# Patient Record
Sex: MALE | Race: ASIAN | NOT HISPANIC OR LATINO
[De-identification: names, ages, dates, MRNs, and addresses within clinical notes are randomized per-mention and may not be internally consistent; named-entity substitution may affect disease eponyms.]

---

## 2017-10-26 ENCOUNTER — APPOINTMENT (OUTPATIENT)
Dept: GASTROENTEROLOGY | Facility: CLINIC | Age: 22
End: 2017-10-26
Payer: COMMERCIAL

## 2017-10-26 VITALS
TEMPERATURE: 98.4 F | WEIGHT: 195 LBS | SYSTOLIC BLOOD PRESSURE: 110 MMHG | HEIGHT: 74 IN | DIASTOLIC BLOOD PRESSURE: 70 MMHG | BODY MASS INDEX: 25.03 KG/M2 | HEART RATE: 88 BPM | OXYGEN SATURATION: 98 %

## 2017-10-26 DIAGNOSIS — R74.8 ABNORMAL LEVELS OF OTHER SERUM ENZYMES: ICD-10-CM

## 2017-10-26 DIAGNOSIS — R79.89 OTHER SPECIFIED ABNORMAL FINDINGS OF BLOOD CHEMISTRY: ICD-10-CM

## 2017-10-26 PROCEDURE — 99244 OFF/OP CNSLTJ NEW/EST MOD 40: CPT

## 2018-05-18 ENCOUNTER — EMERGENCY (EMERGENCY)
Facility: HOSPITAL | Age: 23
LOS: 1 days | Discharge: HOME | End: 2018-05-18
Attending: EMERGENCY MEDICINE | Admitting: EMERGENCY MEDICINE
Payer: COMMERCIAL

## 2018-05-18 VITALS
RESPIRATION RATE: 17 BRPM | SYSTOLIC BLOOD PRESSURE: 135 MMHG | HEIGHT: 73 IN | TEMPERATURE: 99 F | HEART RATE: 72 BPM | OXYGEN SATURATION: 99 % | WEIGHT: 214.07 LBS | DIASTOLIC BLOOD PRESSURE: 72 MMHG

## 2018-05-18 PROCEDURE — 99283 EMERGENCY DEPT VISIT LOW MDM: CPT

## 2018-05-18 PROCEDURE — 99284 EMERGENCY DEPT VISIT MOD MDM: CPT

## 2018-05-18 RX ADMIN — Medication 60 MILLIGRAM(S): at 04:42

## 2018-05-18 NOTE — ED PROVIDER NOTE - CHPI ED SYMPTOMS NEG
no cough/no wheezing/no swelling of face, tongue/no vomiting/no shortness of breath/no nausea/no throat itching/no difficulty breathing/no difficulty swallowing

## 2018-05-18 NOTE — ED PROVIDER NOTE - OBJECTIVE STATEMENT
Patient developed a rash on arms, legs and lower trunk about 3 hours ago. Itchy. Patient took benadryl and states rash is much better, now only on arms. No difficulty speaking, swallowing or breathing. No known new exposures, but has multiple allergies

## 2018-05-18 NOTE — ED ADULT NURSE NOTE - OBJECTIVE STATEMENT
Pt reports itching and rash to his arms, waist area, and behind his knees which he noticed several hours ago. Pt took 2 benadryl tabs and the itching has subsided, the rash resolved around waist and behind his knees, resolving on his arms. Pt is breathing without any difficulty, denies any involvement of his face or throat. Pt has known environmental allergies but does not typically have this type of reaction. Mother is with pt.

## 2018-05-18 NOTE — ED ADULT NURSE NOTE - CAS EDN DISCHARGE ASSESSMENT
Alert and oriented to person, place and time/No adverse reaction to first time med in ED/Symptoms improved/Awake

## 2021-01-20 NOTE — ED ADULT NURSE NOTE - QUALITY
Hospitalist Hospitalist Hospitalist Infectious Disease Intervent Radiology Nephrology Endocrinology Hospitalist Hospitalist Hospitalist Hospitalist Infectious Disease Nephrology Nephrology Cardiology Cardiology Cardiology Endocrinology Endocrinology Endocrinology Endocrinology Endocrinology Hospitalist Hospitalist Hospitalist Hospitalist Hospitalist Hospitalist Hospitalist Infectious Disease Infectious Disease Infectious Disease Infectious Disease Infectious Disease Infectious Disease Infectious Disease Infectious Disease Nephrology Nephrology Nephrology Nephrology Nephrology Endocrinology Endocrinology Endocrinology Endocrinology Hospitalist Hospitalist Hospitalist Hospitalist Hospitalist Infectious Disease Infectious Disease Infectious Disease Nephrology Nephrology Hospitalist Hospitalist itchy

## 2021-11-02 ENCOUNTER — APPOINTMENT (OUTPATIENT)
Dept: DERMATOLOGY | Facility: CLINIC | Age: 26
End: 2021-11-02
Payer: COMMERCIAL

## 2021-11-02 VITALS — WEIGHT: 195 LBS | BODY MASS INDEX: 25.03 KG/M2 | HEIGHT: 74 IN

## 2021-11-02 DIAGNOSIS — L81.0 POSTINFLAMMATORY HYPERPIGMENTATION: ICD-10-CM

## 2021-11-02 DIAGNOSIS — L73.9 FOLLICULAR DISORDER, UNSPECIFIED: ICD-10-CM

## 2021-11-02 DIAGNOSIS — L65.0 TELOGEN EFFLUVIUM: ICD-10-CM

## 2021-11-02 PROCEDURE — 99204 OFFICE O/P NEW MOD 45 MIN: CPT

## 2023-05-12 NOTE — ED ADULT NURSE NOTE - EENT WDL
Addressing in other encounter from 5/12/23.    Eyes with no visual disturbances.  Ears clean and dry and no hearing difficulties. Nose with pink mucosa and no drainage.  Mouth mucous membranes moist and pink.  No tenderness or swelling to throat or neck.

## 2023-11-09 ENCOUNTER — EMERGENCY (EMERGENCY)
Facility: HOSPITAL | Age: 28
LOS: 1 days | Discharge: HOME | End: 2023-11-09
Attending: EMERGENCY MEDICINE
Payer: COMMERCIAL

## 2023-11-09 VITALS
SYSTOLIC BLOOD PRESSURE: 150 MMHG | RESPIRATION RATE: 20 BRPM | OXYGEN SATURATION: 99 % | WEIGHT: 220.02 LBS | HEART RATE: 73 BPM | DIASTOLIC BLOOD PRESSURE: 90 MMHG | TEMPERATURE: 98 F | HEIGHT: 75 IN

## 2023-11-09 VITALS
OXYGEN SATURATION: 99 % | SYSTOLIC BLOOD PRESSURE: 123 MMHG | HEART RATE: 78 BPM | RESPIRATION RATE: 20 BRPM | TEMPERATURE: 98 F | DIASTOLIC BLOOD PRESSURE: 82 MMHG

## 2023-11-09 LAB
ALBUMIN SERPL ELPH-MCNC: 4.9 G/DL — SIGNIFICANT CHANGE UP (ref 3.3–5)
ALBUMIN SERPL ELPH-MCNC: 4.9 G/DL — SIGNIFICANT CHANGE UP (ref 3.3–5)
ALP SERPL-CCNC: 90 U/L — SIGNIFICANT CHANGE UP (ref 40–120)
ALP SERPL-CCNC: 90 U/L — SIGNIFICANT CHANGE UP (ref 40–120)
ALT FLD-CCNC: 45 U/L — SIGNIFICANT CHANGE UP (ref 10–45)
ALT FLD-CCNC: 45 U/L — SIGNIFICANT CHANGE UP (ref 10–45)
ANION GAP SERPL CALC-SCNC: 17 MMOL/L — SIGNIFICANT CHANGE UP (ref 5–17)
ANION GAP SERPL CALC-SCNC: 17 MMOL/L — SIGNIFICANT CHANGE UP (ref 5–17)
APPEARANCE UR: ABNORMAL
APPEARANCE UR: ABNORMAL
AST SERPL-CCNC: 19 U/L — SIGNIFICANT CHANGE UP (ref 10–40)
AST SERPL-CCNC: 19 U/L — SIGNIFICANT CHANGE UP (ref 10–40)
BACTERIA # UR AUTO: NEGATIVE /HPF — SIGNIFICANT CHANGE UP
BACTERIA # UR AUTO: NEGATIVE /HPF — SIGNIFICANT CHANGE UP
BASOPHILS # BLD AUTO: 0.08 K/UL — SIGNIFICANT CHANGE UP (ref 0–0.2)
BASOPHILS # BLD AUTO: 0.08 K/UL — SIGNIFICANT CHANGE UP (ref 0–0.2)
BASOPHILS NFR BLD AUTO: 0.9 % — SIGNIFICANT CHANGE UP (ref 0–2)
BASOPHILS NFR BLD AUTO: 0.9 % — SIGNIFICANT CHANGE UP (ref 0–2)
BILIRUB SERPL-MCNC: 0.3 MG/DL — SIGNIFICANT CHANGE UP (ref 0.2–1.2)
BILIRUB SERPL-MCNC: 0.3 MG/DL — SIGNIFICANT CHANGE UP (ref 0.2–1.2)
BILIRUB UR-MCNC: NEGATIVE — SIGNIFICANT CHANGE UP
BILIRUB UR-MCNC: NEGATIVE — SIGNIFICANT CHANGE UP
BUN SERPL-MCNC: 24 MG/DL — HIGH (ref 7–23)
BUN SERPL-MCNC: 24 MG/DL — HIGH (ref 7–23)
CALCIUM SERPL-MCNC: 9.8 MG/DL — SIGNIFICANT CHANGE UP (ref 8.4–10.5)
CALCIUM SERPL-MCNC: 9.8 MG/DL — SIGNIFICANT CHANGE UP (ref 8.4–10.5)
CAST: 2 /LPF — SIGNIFICANT CHANGE UP (ref 0–4)
CAST: 2 /LPF — SIGNIFICANT CHANGE UP (ref 0–4)
CHLORIDE SERPL-SCNC: 100 MMOL/L — SIGNIFICANT CHANGE UP (ref 96–108)
CHLORIDE SERPL-SCNC: 100 MMOL/L — SIGNIFICANT CHANGE UP (ref 96–108)
CO2 SERPL-SCNC: 23 MMOL/L — SIGNIFICANT CHANGE UP (ref 22–31)
CO2 SERPL-SCNC: 23 MMOL/L — SIGNIFICANT CHANGE UP (ref 22–31)
COLOR SPEC: YELLOW — SIGNIFICANT CHANGE UP
COLOR SPEC: YELLOW — SIGNIFICANT CHANGE UP
CREAT SERPL-MCNC: 1.28 MG/DL — SIGNIFICANT CHANGE UP (ref 0.5–1.3)
CREAT SERPL-MCNC: 1.28 MG/DL — SIGNIFICANT CHANGE UP (ref 0.5–1.3)
DIFF PNL FLD: ABNORMAL
DIFF PNL FLD: ABNORMAL
EGFR: 80 ML/MIN/1.73M2 — SIGNIFICANT CHANGE UP
EGFR: 80 ML/MIN/1.73M2 — SIGNIFICANT CHANGE UP
EOSINOPHIL # BLD AUTO: 0.55 K/UL — HIGH (ref 0–0.5)
EOSINOPHIL # BLD AUTO: 0.55 K/UL — HIGH (ref 0–0.5)
EOSINOPHIL NFR BLD AUTO: 5.9 % — SIGNIFICANT CHANGE UP (ref 0–6)
EOSINOPHIL NFR BLD AUTO: 5.9 % — SIGNIFICANT CHANGE UP (ref 0–6)
GLUCOSE SERPL-MCNC: 96 MG/DL — SIGNIFICANT CHANGE UP (ref 70–99)
GLUCOSE SERPL-MCNC: 96 MG/DL — SIGNIFICANT CHANGE UP (ref 70–99)
GLUCOSE UR QL: NEGATIVE MG/DL — SIGNIFICANT CHANGE UP
GLUCOSE UR QL: NEGATIVE MG/DL — SIGNIFICANT CHANGE UP
HCT VFR BLD CALC: 48.9 % — SIGNIFICANT CHANGE UP (ref 39–50)
HCT VFR BLD CALC: 48.9 % — SIGNIFICANT CHANGE UP (ref 39–50)
HGB BLD-MCNC: 15.6 G/DL — SIGNIFICANT CHANGE UP (ref 13–17)
HGB BLD-MCNC: 15.6 G/DL — SIGNIFICANT CHANGE UP (ref 13–17)
IMM GRANULOCYTES NFR BLD AUTO: 0.3 % — SIGNIFICANT CHANGE UP (ref 0–0.9)
IMM GRANULOCYTES NFR BLD AUTO: 0.3 % — SIGNIFICANT CHANGE UP (ref 0–0.9)
KETONES UR-MCNC: NEGATIVE MG/DL — SIGNIFICANT CHANGE UP
KETONES UR-MCNC: NEGATIVE MG/DL — SIGNIFICANT CHANGE UP
LEUKOCYTE ESTERASE UR-ACNC: ABNORMAL
LEUKOCYTE ESTERASE UR-ACNC: ABNORMAL
LYMPHOCYTES # BLD AUTO: 2.67 K/UL — SIGNIFICANT CHANGE UP (ref 1–3.3)
LYMPHOCYTES # BLD AUTO: 2.67 K/UL — SIGNIFICANT CHANGE UP (ref 1–3.3)
LYMPHOCYTES # BLD AUTO: 28.5 % — SIGNIFICANT CHANGE UP (ref 13–44)
LYMPHOCYTES # BLD AUTO: 28.5 % — SIGNIFICANT CHANGE UP (ref 13–44)
MCHC RBC-ENTMCNC: 27.8 PG — SIGNIFICANT CHANGE UP (ref 27–34)
MCHC RBC-ENTMCNC: 27.8 PG — SIGNIFICANT CHANGE UP (ref 27–34)
MCHC RBC-ENTMCNC: 31.9 GM/DL — LOW (ref 32–36)
MCHC RBC-ENTMCNC: 31.9 GM/DL — LOW (ref 32–36)
MCV RBC AUTO: 87.2 FL — SIGNIFICANT CHANGE UP (ref 80–100)
MCV RBC AUTO: 87.2 FL — SIGNIFICANT CHANGE UP (ref 80–100)
MONOCYTES # BLD AUTO: 0.7 K/UL — SIGNIFICANT CHANGE UP (ref 0–0.9)
MONOCYTES # BLD AUTO: 0.7 K/UL — SIGNIFICANT CHANGE UP (ref 0–0.9)
MONOCYTES NFR BLD AUTO: 7.5 % — SIGNIFICANT CHANGE UP (ref 2–14)
MONOCYTES NFR BLD AUTO: 7.5 % — SIGNIFICANT CHANGE UP (ref 2–14)
NEUTROPHILS # BLD AUTO: 5.34 K/UL — SIGNIFICANT CHANGE UP (ref 1.8–7.4)
NEUTROPHILS # BLD AUTO: 5.34 K/UL — SIGNIFICANT CHANGE UP (ref 1.8–7.4)
NEUTROPHILS NFR BLD AUTO: 56.9 % — SIGNIFICANT CHANGE UP (ref 43–77)
NEUTROPHILS NFR BLD AUTO: 56.9 % — SIGNIFICANT CHANGE UP (ref 43–77)
NITRITE UR-MCNC: NEGATIVE — SIGNIFICANT CHANGE UP
NITRITE UR-MCNC: NEGATIVE — SIGNIFICANT CHANGE UP
NRBC # BLD: 0 /100 WBCS — SIGNIFICANT CHANGE UP (ref 0–0)
NRBC # BLD: 0 /100 WBCS — SIGNIFICANT CHANGE UP (ref 0–0)
PH UR: 5.5 — SIGNIFICANT CHANGE UP (ref 5–8)
PH UR: 5.5 — SIGNIFICANT CHANGE UP (ref 5–8)
PLATELET # BLD AUTO: 371 K/UL — SIGNIFICANT CHANGE UP (ref 150–400)
PLATELET # BLD AUTO: 371 K/UL — SIGNIFICANT CHANGE UP (ref 150–400)
POTASSIUM SERPL-MCNC: 4.2 MMOL/L — SIGNIFICANT CHANGE UP (ref 3.5–5.3)
POTASSIUM SERPL-MCNC: 4.2 MMOL/L — SIGNIFICANT CHANGE UP (ref 3.5–5.3)
POTASSIUM SERPL-SCNC: 4.2 MMOL/L — SIGNIFICANT CHANGE UP (ref 3.5–5.3)
POTASSIUM SERPL-SCNC: 4.2 MMOL/L — SIGNIFICANT CHANGE UP (ref 3.5–5.3)
PROT SERPL-MCNC: 8.1 G/DL — SIGNIFICANT CHANGE UP (ref 6–8.3)
PROT SERPL-MCNC: 8.1 G/DL — SIGNIFICANT CHANGE UP (ref 6–8.3)
PROT UR-MCNC: SIGNIFICANT CHANGE UP MG/DL
PROT UR-MCNC: SIGNIFICANT CHANGE UP MG/DL
RBC # BLD: 5.61 M/UL — SIGNIFICANT CHANGE UP (ref 4.2–5.8)
RBC # BLD: 5.61 M/UL — SIGNIFICANT CHANGE UP (ref 4.2–5.8)
RBC # FLD: 11.8 % — SIGNIFICANT CHANGE UP (ref 10.3–14.5)
RBC # FLD: 11.8 % — SIGNIFICANT CHANGE UP (ref 10.3–14.5)
RBC CASTS # UR COMP ASSIST: 11 /HPF — HIGH (ref 0–4)
RBC CASTS # UR COMP ASSIST: 11 /HPF — HIGH (ref 0–4)
REVIEW: SIGNIFICANT CHANGE UP
REVIEW: SIGNIFICANT CHANGE UP
SODIUM SERPL-SCNC: 140 MMOL/L — SIGNIFICANT CHANGE UP (ref 135–145)
SODIUM SERPL-SCNC: 140 MMOL/L — SIGNIFICANT CHANGE UP (ref 135–145)
SP GR SPEC: 1.02 — SIGNIFICANT CHANGE UP (ref 1–1.03)
SP GR SPEC: 1.02 — SIGNIFICANT CHANGE UP (ref 1–1.03)
SQUAMOUS # UR AUTO: 2 /HPF — SIGNIFICANT CHANGE UP (ref 0–5)
SQUAMOUS # UR AUTO: 2 /HPF — SIGNIFICANT CHANGE UP (ref 0–5)
URATE CRY FLD QL MICRO: PRESENT
URATE CRY FLD QL MICRO: PRESENT
UROBILINOGEN FLD QL: 0.2 MG/DL — SIGNIFICANT CHANGE UP (ref 0.2–1)
UROBILINOGEN FLD QL: 0.2 MG/DL — SIGNIFICANT CHANGE UP (ref 0.2–1)
WBC # BLD: 9.37 K/UL — SIGNIFICANT CHANGE UP (ref 3.8–10.5)
WBC # BLD: 9.37 K/UL — SIGNIFICANT CHANGE UP (ref 3.8–10.5)
WBC # FLD AUTO: 9.37 K/UL — SIGNIFICANT CHANGE UP (ref 3.8–10.5)
WBC # FLD AUTO: 9.37 K/UL — SIGNIFICANT CHANGE UP (ref 3.8–10.5)
WBC UR QL: 6 /HPF — HIGH (ref 0–5)
WBC UR QL: 6 /HPF — HIGH (ref 0–5)

## 2023-11-09 PROCEDURE — 74018 RADEX ABDOMEN 1 VIEW: CPT | Mod: 26

## 2023-11-09 PROCEDURE — 80053 COMPREHEN METABOLIC PANEL: CPT

## 2023-11-09 PROCEDURE — 81001 URINALYSIS AUTO W/SCOPE: CPT

## 2023-11-09 PROCEDURE — 76770 US EXAM ABDO BACK WALL COMP: CPT | Mod: 26

## 2023-11-09 PROCEDURE — 99284 EMERGENCY DEPT VISIT MOD MDM: CPT

## 2023-11-09 PROCEDURE — 85025 COMPLETE CBC W/AUTO DIFF WBC: CPT

## 2023-11-09 PROCEDURE — 99284 EMERGENCY DEPT VISIT MOD MDM: CPT | Mod: 25

## 2023-11-09 PROCEDURE — 76770 US EXAM ABDO BACK WALL COMP: CPT

## 2023-11-09 PROCEDURE — 74018 RADEX ABDOMEN 1 VIEW: CPT

## 2023-11-09 PROCEDURE — 87086 URINE CULTURE/COLONY COUNT: CPT

## 2023-11-09 PROCEDURE — 96374 THER/PROPH/DIAG INJ IV PUSH: CPT

## 2023-11-09 RX ORDER — ONDANSETRON 8 MG/1
1 TABLET, FILM COATED ORAL
Qty: 6 | Refills: 0
Start: 2023-11-09 | End: 2023-11-10

## 2023-11-09 RX ORDER — OXYCODONE HYDROCHLORIDE 5 MG/1
1 TABLET ORAL
Qty: 6 | Refills: 0
Start: 2023-11-09 | End: 2023-11-10

## 2023-11-09 RX ORDER — SODIUM CHLORIDE 9 MG/ML
1000 INJECTION, SOLUTION INTRAVENOUS ONCE
Refills: 0 | Status: COMPLETED | OUTPATIENT
Start: 2023-11-09 | End: 2023-11-09

## 2023-11-09 RX ORDER — ONDANSETRON 8 MG/1
1 TABLET, FILM COATED ORAL
Qty: 1 | Refills: 0
Start: 2023-11-09 | End: 2023-11-11

## 2023-11-09 RX ORDER — KETOROLAC TROMETHAMINE 30 MG/ML
15 SYRINGE (ML) INJECTION ONCE
Refills: 0 | Status: DISCONTINUED | OUTPATIENT
Start: 2023-11-09 | End: 2023-11-09

## 2023-11-09 RX ADMIN — SODIUM CHLORIDE 1000 MILLILITER(S): 9 INJECTION, SOLUTION INTRAVENOUS at 16:16

## 2023-11-09 RX ADMIN — Medication 15 MILLIGRAM(S): at 16:16

## 2023-11-09 NOTE — ED ADULT NURSE NOTE - NSFALLUNIVINTERV_ED_ALL_ED
Bed/Stretcher in lowest position, wheels locked, appropriate side rails in place/Call bell, personal items and telephone in reach/Instruct patient to call for assistance before getting out of bed/chair/stretcher/Non-slip footwear applied when patient is off stretcher/[place] to call system/Physically safe environment - no spills, clutter or unnecessary equipment/Purposeful proactive rounding/Room/bathroom lighting operational, light cord in reach

## 2023-11-09 NOTE — CONSULT NOTE ADULT - ASSESSMENT
27M with nephrolithiasis noted on CT scan from 11/1, currently asymptomatic. Labs unremarkable, UA positive for blood likely in setting of stone.     Recommendations:  - no need for admission or repeat imaging from urologic standpoint, may obtain renal ultrasound per discretion of ED   - pain control PRN if patient needs it   - recommend return to ED with fever/chills, increased pain, nausea/vomiting, difficulty urinating  27M with nephrolithiasis noted on CT scan from 11/1, currently asymptomatic. Labs unremarkable, UA positive for blood likely in setting of stone.     Recommendations:  - no need for admission or repeat imaging from urologic standpoint, may obtain renal ultrasound per discretion of ED   - pain control PRN if patient needs it   - recommend return to ED with fever/chills, increased pain, nausea/vomiting, difficulty urinating   - patient may follow routinely with Dr. Vu Mcconnell at the [facility] for Urology [phone]   27M with nephrolithiasis noted on CT scan from 11/1, currently asymptomatic. Labs unremarkable, UA positive for blood likely in setting of stone.     Recommendations:  - no need for admission or repeat imaging from urologic standpoint, may obtain renal ultrasound per discretion of ED   - pain control PRN if patient needs it   - recommend return to ED with fever/chills, increased pain, nausea/vomiting, difficulty urinating   - patient may follow with Dr. Vu Mcconnell at the [facility] for Urology [phone], if he does not pass his stone in the next 2 weeks then should have intervention with ureteroscopy  - Please send urine culture

## 2023-11-09 NOTE — ED PROVIDER NOTE - PATIENT PORTAL LINK FT
You can access the FollowMyHealth Patient Portal offered by [facility] by registering at the following website: [email or web address]. By joining [employer]’s FollowMyHealth portal, you will also be able to view your health information using other applications (apps) compatible with our system.

## 2023-11-09 NOTE — ED PROVIDER NOTE - OBJECTIVE STATEMENT
27-year-old male presenting with right pelvic pain, sent in by his urologist Dr. Light.  Patient has been complaining of right lower abdominal, right flank pain for the last 2 weeks, on 11/1 received a CT scan abdomen pelvis which indicated 6 mm kidney stone with right hydronephrosis.  At that time he was placed on Flomax and was taking ibuprofen for pain.  Patient states that despite this medical management he has continued to experience intermittent pain.  On arrival he does not complain of any pain, fever, chills, body aches, nausea, vomiting.

## 2023-11-09 NOTE — ED ADULT NURSE NOTE - OBJECTIVE STATEMENT
28y/o M coming to the ED c.o R pelvic pain. Pt states that shes been having RLQ, R flank pain for the past x2weeks, saw urology outpt and had ctscan showing   6 mm kidney stone with right hydronephrosis. Pt was taking flomax & motrin w/o relief. Pt denies any Cp/SOB/Fever/Chills/N/V/D. On exam, pt is breathing spontaneously, able to speak full sentences w.o difficulty, saturating 98% RA. Abdomen soft, nondistended, tender. IV placed, labs collected and sent. VSS.

## 2023-11-09 NOTE — ED PROVIDER NOTE - CLINICAL SUMMARY MEDICAL DECISION MAKING FREE TEXT BOX
27-year-old male presenting with right pelvic pain, sent in by his urologist Dr. Light.  Patient has been complaining of right lower abdominal, right flank pain for the last 2 weeks, on 11/1 received a CT scan abdomen pelvis which indicated 6 mm kidney stone with right hydronephrosis.  At that time he was placed on Flomax and was taking ibuprofen for pain.  Patient states that despite this medical management he has continued to experience intermittent pain.  On arrival he does not complain of any pain, fever, chills, body aches, nausea, vomiting. Will order CBC, CMP, UA and repeat CT abdomen/pelvis w/o IV contrast. Contacted urology, will evaluate the patient recommend repeat CT abdomen/pelvis at this time. 27-year-old male presenting with right pelvic pain, sent in by his urologist Dr. Light.  Patient has been complaining of right lower abdominal, right flank pain for the last 2 weeks, on 11/1 received a CT scan abdomen pelvis which indicated 6 mm kidney stone with right hydronephrosis.  At that time he was placed on Flomax and was taking ibuprofen for pain.  Patient states that despite this medical management he has continued to experience intermittent pain.  On arrival he does not complain of any pain, fever, chills, body aches, nausea, vomiting. Will order CBC, CMP, UA and renal US and abdominal x-ray. Contacted urology, will evaluate the patient and return with our recommendations.

## 2023-11-09 NOTE — ED PROVIDER NOTE - ATTENDING CONTRIBUTION TO CARE
Right flank pain with known stone in the 6 mm in the right side.  Has not completely resolved but is fairly mild.   exam was performed by myself with the resident at bedside and was normal.  No CVA tenderness no abdominal tenderness  Patient with renal stone requesting to see urology.  Have urology see the patient to the ER.  Basic blood work urinalysis urine culture sono and abdominal x-ray.

## 2023-11-09 NOTE — ED PROVIDER NOTE - PHYSICAL EXAMINATION
Physical Exam:  Gen: NAD, AOx3, non-toxic appearing, able to ambulate without assistance  Head: NCAT  HEENT: EOMI, PEERLA, normal conjunctiva, tongue midline, oral mucosa moist  Lung: CTAB, no respiratory distress, no wheezes/rhonchi/rales B/L, speaking in full sentences  CV: RRR, no murmurs, rubs or gallops  Abd: soft, NT, ND, no guarding, no rigidity, no rebound tenderness, no CVA tenderness   MSK: no visible deformities, ROM normal in UE/LE, no back pain  Neuro: No focal sensory or motor deficits  Skin: Warm, well perfused, no rash, no leg swelling  Psych: normal affect, calm Physical Exam:  Gen: NAD, AOx3, non-toxic appearing, able to ambulate without assistance  Head: NCAT  HEENT: EOMI, PEERLA, normal conjunctiva, tongue midline, oral mucosa moist  Lung: CTAB, no respiratory distress, no wheezes/rhonchi/rales B/L, speaking in full sentences  CV: RRR, no murmurs, rubs or gallops  Abd: soft, NT, ND, no guarding, no rigidity, no rebound tenderness, no CVA tenderness   : no testicular tenderness, no penile lesions, no inguinal lesions or erythema  MSK: no visible deformities, ROM normal in UE/LE, no back pain  Neuro: No focal sensory or motor deficits  Skin: Warm, well perfused, no rash, no leg swelling  Psych: normal affect, calm

## 2023-11-09 NOTE — ED PROVIDER NOTE - RAPID ASSESSMENT
26 y/o male presents to the ED sent in by urologist for kidney stone. Patient states that he had CT scan today which revealed kidney stone. Called and spoke to urologist and was instructed to go to [facility] ED for evaluation. States that urology is supposed to come see him while in ED. Patient had pain yesterday but today has subsided. Has mild pain to right groin currently. Denies any fevers. No vomiting.    Patient was rapidly assessed via a telemedicine and/or role of Quick Triage PAWEL Mcbride. A limited history, physical exam and assessment was performed. The patient will be seen and further evaluated in the main emergency department. The remainder of care and evaluation will be conducted by the primary emergency medicine team. Receiving team will follow up on labs, imaging and serially reassess patient as indicated. All further decisions regarding patient care, evaluation and disposition are at the discretion of the receiving primary emergency department team. 28 y/o male presents to the ED sent in by urologist for kidney stone. Patient states that he had CT scan which revealed kidney stone. Called and spoke to urologist and was instructed to go to [facility] ED for evaluation. States that urology is supposed to come see him while in ED. Patient had pain yesterday but today has subsided. Has mild pain to right groin currently. Denies any fevers. No vomiting.    Patient was rapidly assessed via a telemedicine and/or role of Quick Triage PAWEL Mcbride. A limited history, physical exam and assessment was performed. The patient will be seen and further evaluated in the main emergency department. The remainder of care and evaluation will be conducted by the primary emergency medicine team. Receiving team will follow up on labs, imaging and serially reassess patient as indicated. All further decisions regarding patient care, evaluation and disposition are at the discretion of the receiving primary emergency department team. 26 y/o male PmHx Asthma and HLD  presents to the ED sent in by urologist for kidney stone. Patient states that he had CT scan which revealed kidney stone. Called and spoke to urologist and was instructed to go to [facility] ED for evaluation. States that urology is supposed to come see him while in ED. Patient had pain yesterday but today has subsided. Has mild pain to right groin currently. Denies any fevers. No vomiting.    Patient was rapidly assessed via a telemedicine and/or role of Quick Triage PAWEL Mcbride. A limited history, physical exam and assessment was performed. The patient will be seen and further evaluated in the main emergency department. The remainder of care and evaluation will be conducted by the primary emergency medicine team. Receiving team will follow up on labs, imaging and serially reassess patient as indicated. All further decisions regarding patient care, evaluation and disposition are at the discretion of the receiving primary emergency department team.

## 2023-11-09 NOTE — ED PROVIDER NOTE - NSFOLLOWUPINSTRUCTIONS_ED_ALL_ED_FT
Today you were seen at [facility] emergency department for pain related to a kidney stone. While you were here we preformed an abdominal x-ray and Kidney/Bladder US. You were also seen by urology. We recommend outpatinet follow up at the below number.    Please follow up with: Dr. Vu Mcconnell at the [facility] for Urology [phone].    Please continue to take flomax and ibuprofen as prescribed.    We will prescribe zofran (antinausea) and oxycodone (pain medication) for two days, please take as prescribed.     Please return to the emergency department if you expereince fever, chills, body aches, pain that is uncontrolled with prescribed medications.    *********************************************************************************************  Kidney stones are solid, rock-like deposits that form inside of the kidneys. The kidneys are a pair of organs that make urine. A kidney stone may form in a kidney and move into other parts of the urinary tract, including the tubes that connect the kidneys to the bladder (ureters), the bladder, and the tube that carries urine out of the body (urethra). As the stone moves through these areas, it can cause intense pain and block the flow of urine.    Kidney stones are created when high levels of certain minerals are found in the urine. The stones are usually passed out of the body through urination, but in some cases, medical treatment may be needed to remove them.    What are the causes?  Kidney stones may be caused by:  A condition in which certain glands produce too much parathyroid hormone (primary hyperparathyroidism), which causes too much calcium buildup in the blood.  A buildup of uric acid crystals in the bladder (hyperuricosuria). Uric acid is a chemical that the body produces when you eat certain foods. It usually leaves the body in the urine.  Narrowing (stricture) of one or both of the ureters.  A kidney blockage that is present at birth (congenital obstruction).  Past surgery on the kidney or the ureters.  What increases the risk?  The following factors may make you more likely to develop this condition:  Having had a kidney stone in the past.  Having a family history of kidney stones.  Not drinking enough water.  Eating a diet that is high in protein, salt (sodium), or sugar.  Being overweight or obese.  What are the signs or symptoms?  Symptoms of a kidney stone may include:  Pain in the side of the abdomen, right below the ribs (flank pain). Pain usually spreads (radiates) to the groin.  Needing to urinate often or urgently.  Painful urination.  Blood in the urine (hematuria).  Nausea.  Vomiting.  Fever and chills.  How is this diagnosed?  This condition may be diagnosed based on:  Your symptoms and medical history.  A physical exam.  Blood tests.  Urine tests. These may be done before and after the stone passes out of your body through urination.  Imaging tests, such as a CT scan, abdominal X-ray, or ultrasound.  A procedure to examine the inside of the bladder (cystoscopy).  How is this treated?  Treatment for kidney stones depends on the size, location, and makeup of the stones. Kidney stones will often pass out of the body through urination. You may need to:  Increase your fluid intake to help pass the stone. In some cases, you may be given fluids through an IV and may need to be monitored in the hospital.  Take medicine for pain.  Make changes in your diet to help prevent kidney stones from coming back.  Sometimes, procedures are needed to remove a kidney stone. This may involve:  A procedure to break up kidney stones using:  A focused beam of light (laser therapy).  Shock waves (extracorporeal shock wave lithotripsy).  Surgery to remove kidney stones. This may be needed if you have severe pain or have stones that block your urinary tract.  Follow these instructions at home:  Medicines    Take over-the-counter and prescription medicines only as told by your health care provider.  Ask your health care provider if the medicine prescribed to you requires you to avoid driving or using heavy machinery.  Eating and drinking    Drink enough fluid to keep your urine pale yellow. You may be instructed to drink at least 8–10 glasses of water each day. This will help you pass the kidney stone.  If directed, change your diet. This may include:  Limiting how much sodium you eat.  Eating more fruits and vegetables.  Limiting how much animal protein you eat. Animal proteins include red meat, poultry, fish, and eggs.  Eating a normal amount of calcium (1,000–1,300 mg per day).  Follow instructions from your health care provider about eating or drinking restrictions.  General instructions    Collect urine samples as told by your health care provider. You may need to collect a urine sample:  24 hours after you pass the stone.  8–12 weeks after you pass the kidney stone, and every 6–12 months after that.  Strain your urine every time you urinate, for as long as directed. Use the strainer that your health care provider recommends.  Do not throw out the kidney stone after passing it. Keep the stone so it can be tested by your health care provider. Testing the makeup of your kidney stone may help prevent you from getting kidney stones in the future.  Keep all follow-up visits. You may need follow-up X-rays or ultrasounds to make sure that your stone has passed.  How is this prevented?  A comparison of three sample cups showing dark yellow, yellow, and pale yellow urine.  To prevent another kidney stone:  Drink enough fluid to keep your urine pale yellow. This is the best way to prevent kidney stones.  Eat a healthy diet. Follow recommendations from your health care provider about foods to avoid. Recommendations vary depending on the type of kidney stone that you have. You may be instructed to eat a low-protein diet.  Maintain a healthy weight.  Where to find more information  National Kidney Foundation (NKF): www.kidney.org  [employer] (UCF): www.urologyhealth.org  Contact a health care provider if:  You have pain that gets worse or does not get better with medicine.  Get help right away if:  You have a fever or chills.  You develop severe pain.  You develop new abdominal pain.  You faint.  You are unable to urinate.  Summary  Kidney stones are solid, rock-like deposits that form inside of the kidneys.  Kidney stones can cause nausea, vomiting, blood in the urine, abdominal pain, and the urge to urinate often.  Treatment for kidney stones depends on the size, location, and makeup of the stones. Kidney stones will often pass out of the body through urination.  Kidney stones can be prevented by drinking enough fluids, eating a healthy diet, and maintaining a healthy weight.

## 2023-11-09 NOTE — ED PROVIDER NOTE - PROGRESS NOTE DETAILS
Attending MD Esparza: Reassessed patient, states he has not had pain in 2 days.  Lab work is fairly unrevealing ultrasound with right hydronephrosis mild which is expected.  Patient with no signs of infection.  Explained to patient plan per urology is for ongoing trial of medical expulsive therapy.  He is in understanding of this.  Will discharge home with outpatient urology follow-up.

## 2023-11-09 NOTE — CONSULT NOTE ADULT - SUBJECTIVE AND OBJECTIVE BOX
HPI:  27M with PMHx listed below, presents for kidney stone evaluation.  Patient has had right lower abdominal pain for the last 2 weeks, on  received a CT scan abdomen pelvis which indicated 6 mm kidney stone with right hydronephrosis.  At that time he was placed on Flomax and was taking ibuprofen for pain.  Still experiencing intermittent pain and nausea since. Denies any pain at this time, dysuria, nausea, vomiting.     PAST MEDICAL & SURGICAL HISTORY:  Hyperlipemia  Asthma    No significant past surgical history    FAMILY HISTORY:  nephrolithiasis, grandmother    Allergies  No Known Allergies    REVIEW OF SYSTEMS: Pertinent positives and negatives as stated in HPI, otherwise negative    Vital signs  T(C): 36.7 (23 @ 12:10), Max: 36.7 (23 @ 12:10)  HR: 73 (23 @ 12:10)  BP: 150/90 (23 @ 12:10)  SpO2: 99% (23 @ 12:10)  Wt(kg): --    Physical Exam  Gen: NAD  HEENT: normocephalic, atraumatic, no scleral icterus  Pulm: no respiratory distress, no subcostal retractions, no accessory muscle use   CV: appears well perfused  Abd: Soft, NT, ND, no rebound tenderness or guarding  : no suprapubic tenderness  Back: no CVAT  NEURO: A&Ox3  SKIN: warm, dry, no rash.    LABS:   @ 13:10    WBC 9.37  / Hct 48.9  / SCr 1.28         140  |  100  |  24<H>  ----------------------------<  96  4.2   |  23  |  1.28    Ca    9.8      2023 13:10    TPro  8.1  /  Alb  4.9  /  TBili  0.3  /  DBili  x   /  AST  19  /  ALT  45  /  AlkPhos  90        Urinalysis Basic - ( 2023 14:15 )    Color: Yellow / Appearance: Cloudy / S.018 / pH: x  Gluc: x / Ketone: Negative mg/dL  / Bili: Negative / Urobili: 0.2 mg/dL   Blood: x / Protein: Trace mg/dL / Nitrite: Negative   Leuk Esterase: Trace / RBC: 11 HPF / WBC 6 /HPF   Sq Epi: x / Non Sq Epi: 2 /HPF / Bacteria: Negative /HPF

## 2023-11-10 LAB
CULTURE RESULTS: SIGNIFICANT CHANGE UP
CULTURE RESULTS: SIGNIFICANT CHANGE UP
SPECIMEN SOURCE: SIGNIFICANT CHANGE UP
SPECIMEN SOURCE: SIGNIFICANT CHANGE UP

## 2023-11-13 ENCOUNTER — APPOINTMENT (OUTPATIENT)
Dept: UROLOGY | Facility: CLINIC | Age: 28
End: 2023-11-13
Payer: COMMERCIAL

## 2023-11-13 VITALS
SYSTOLIC BLOOD PRESSURE: 133 MMHG | BODY MASS INDEX: 27.35 KG/M2 | OXYGEN SATURATION: 98 % | HEIGHT: 75 IN | WEIGHT: 220 LBS | TEMPERATURE: 97.3 F | RESPIRATION RATE: 16 BRPM | DIASTOLIC BLOOD PRESSURE: 89 MMHG | HEART RATE: 83 BPM

## 2023-11-13 DIAGNOSIS — Z84.1 FAMILY HISTORY OF DISORDERS OF KIDNEY AND URETER: ICD-10-CM

## 2023-11-13 DIAGNOSIS — Z80.41 FAMILY HISTORY OF MALIGNANT NEOPLASM OF OVARY: ICD-10-CM

## 2023-11-13 PROCEDURE — 99204 OFFICE O/P NEW MOD 45 MIN: CPT

## 2023-11-13 RX ORDER — ONDANSETRON HYDROCHLORIDE 4 MG/1
TABLET, FILM COATED ORAL
Refills: 0 | Status: ACTIVE | COMMUNITY

## 2023-11-13 RX ORDER — TAMSULOSIN HYDROCHLORIDE 0.4 MG/1
CAPSULE ORAL
Refills: 0 | Status: ACTIVE | COMMUNITY

## 2023-11-16 ENCOUNTER — OUTPATIENT (OUTPATIENT)
Dept: OUTPATIENT SERVICES | Facility: HOSPITAL | Age: 28
LOS: 1 days | End: 2023-11-16
Payer: COMMERCIAL

## 2023-11-16 VITALS
TEMPERATURE: 98 F | HEIGHT: 75 IN | RESPIRATION RATE: 16 BRPM | SYSTOLIC BLOOD PRESSURE: 132 MMHG | WEIGHT: 218.92 LBS | OXYGEN SATURATION: 98 % | DIASTOLIC BLOOD PRESSURE: 88 MMHG | HEART RATE: 96 BPM

## 2023-11-16 DIAGNOSIS — N20.0 CALCULUS OF KIDNEY: ICD-10-CM

## 2023-11-16 DIAGNOSIS — Z90.49 ACQUIRED ABSENCE OF OTHER SPECIFIED PARTS OF DIGESTIVE TRACT: Chronic | ICD-10-CM

## 2023-11-16 DIAGNOSIS — Z01.818 ENCOUNTER FOR OTHER PREPROCEDURAL EXAMINATION: ICD-10-CM

## 2023-11-16 PROCEDURE — G0463: CPT

## 2023-11-16 RX ORDER — SODIUM CHLORIDE 9 MG/ML
1000 INJECTION, SOLUTION INTRAVENOUS
Refills: 0 | Status: DISCONTINUED | OUTPATIENT
Start: 2023-11-28 | End: 2023-12-12

## 2023-11-16 RX ORDER — SODIUM CHLORIDE 9 MG/ML
3 INJECTION INTRAMUSCULAR; INTRAVENOUS; SUBCUTANEOUS EVERY 8 HOURS
Refills: 0 | Status: DISCONTINUED | OUTPATIENT
Start: 2023-11-28 | End: 2023-12-12

## 2023-11-16 NOTE — H&P PST ADULT - ASSESSMENT
DASI: able to go up one flight of stairs or walk 1-2 blocks with out difficulty  Loose teeth: denies

## 2023-11-16 NOTE — H&P PST ADULT - HISTORY OF PRESENT ILLNESS
26 y/o M with h/o HLD and Asthma c/o right lower abdominal pain for the last 2-3 weeks, s/p ER visit 11/9/23 s/p CT abdomen pelvis which indicated 6 mm kidney stone with right hydronephrosis.  At that time he was placed on Flomax and was taking ibuprofen for pain. Today he present to [facility] for scheduled Cystoscopy Right Ureteroscopy Laser Lithotripsy Stent Placement on 11/28/23. Denies any palpitations, SOB, N/V, fever or chills.  28 y/o M with h/o HLD and Asthma (mild) c/o right lower abdominal pain for the last 2-3 weeks, s/p ER visit 11/9/23 s/p CT abdomen pelvis which indicated 6 mm kidney stone with right hydronephrosis.  At that time he was placed on Flomax and was taking ibuprofen for pain. Today he present to [facility] for scheduled Cystoscopy Right Ureteroscopy Laser Lithotripsy Stent Placement on 11/28/23. Denies any palpitations, SOB, N/V, fever or chills.

## 2023-11-16 NOTE — H&P PST ADULT - PROBLEM SELECTOR PLAN 1
Cystoscopy Right Ureteroscopy Laser Lithotripsy Stent Placement on 11/28/23  CBC/BMP and urine culture in sunrise form 11/9/23  Pre-op education provided - all questions answered. Pt verbalized understanding

## 2023-11-27 ENCOUNTER — TRANSCRIPTION ENCOUNTER (OUTPATIENT)
Age: 28
End: 2023-11-27

## 2023-11-28 ENCOUNTER — APPOINTMENT (OUTPATIENT)
Dept: UROLOGY | Facility: HOSPITAL | Age: 28
End: 2023-11-28

## 2023-11-28 ENCOUNTER — OUTPATIENT (OUTPATIENT)
Dept: OUTPATIENT SERVICES | Facility: HOSPITAL | Age: 28
LOS: 1 days | End: 2023-11-28
Payer: COMMERCIAL

## 2023-11-28 ENCOUNTER — TRANSCRIPTION ENCOUNTER (OUTPATIENT)
Age: 28
End: 2023-11-28

## 2023-11-28 VITALS
DIASTOLIC BLOOD PRESSURE: 77 MMHG | SYSTOLIC BLOOD PRESSURE: 136 MMHG | OXYGEN SATURATION: 98 % | HEART RATE: 65 BPM | RESPIRATION RATE: 17 BRPM

## 2023-11-28 VITALS
DIASTOLIC BLOOD PRESSURE: 75 MMHG | TEMPERATURE: 98 F | RESPIRATION RATE: 16 BRPM | HEART RATE: 76 BPM | WEIGHT: 218.92 LBS | SYSTOLIC BLOOD PRESSURE: 138 MMHG | HEIGHT: 75 IN | OXYGEN SATURATION: 98 %

## 2023-11-28 DIAGNOSIS — Z90.49 ACQUIRED ABSENCE OF OTHER SPECIFIED PARTS OF DIGESTIVE TRACT: Chronic | ICD-10-CM

## 2023-11-28 DIAGNOSIS — N20.0 CALCULUS OF KIDNEY: ICD-10-CM

## 2023-11-28 PROCEDURE — C2617: CPT

## 2023-11-28 PROCEDURE — 52332 CYSTOSCOPY AND TREATMENT: CPT | Mod: RT

## 2023-11-28 PROCEDURE — C1769: CPT

## 2023-11-28 PROCEDURE — C1758: CPT

## 2023-11-28 PROCEDURE — 76000 FLUOROSCOPY <1 HR PHYS/QHP: CPT

## 2023-11-28 PROCEDURE — 74420 UROGRAPHY RTRGR +-KUB: CPT | Mod: 26,RT

## 2023-11-28 PROCEDURE — C1889: CPT

## 2023-11-28 PROCEDURE — 52351 CYSTOURETERO & OR PYELOSCOPE: CPT | Mod: RT

## 2023-11-28 DEVICE — GUIDEWIRE SENSOR DUAL-FLEX NITINOL STRAIGHT .035" X 150CM: Type: IMPLANTABLE DEVICE | Status: FUNCTIONAL

## 2023-11-28 DEVICE — LASER FIBER SOLTIVE 200 BALL TIP: Type: IMPLANTABLE DEVICE | Status: FUNCTIONAL

## 2023-11-28 DEVICE — URETERAL CATH OPEN END 5FR 70CM: Type: IMPLANTABLE DEVICE | Status: FUNCTIONAL

## 2023-11-28 DEVICE — STENT URET INLAY OPTIMA 6FRX26CM: Type: IMPLANTABLE DEVICE | Status: FUNCTIONAL

## 2023-11-28 DEVICE — IMPLANTABLE DEVICE: Type: IMPLANTABLE DEVICE | Status: FUNCTIONAL

## 2023-11-28 RX ORDER — LIDOCAINE HCL 20 MG/ML
0.2 VIAL (ML) INJECTION ONCE
Refills: 0 | Status: COMPLETED | OUTPATIENT
Start: 2023-11-28 | End: 2023-11-28

## 2023-11-28 RX ORDER — ALBUTEROL 90 UG/1
2 AEROSOL, METERED ORAL
Refills: 0 | DISCHARGE

## 2023-11-28 RX ORDER — ACETAMINOPHEN 500 MG
1000 TABLET ORAL EVERY 6 HOURS
Refills: 0 | Status: DISCONTINUED | OUTPATIENT
Start: 2023-11-28 | End: 2023-12-12

## 2023-11-28 RX ORDER — FENTANYL CITRATE 50 UG/ML
25 INJECTION INTRAVENOUS
Refills: 0 | Status: DISCONTINUED | OUTPATIENT
Start: 2023-11-28 | End: 2023-11-28

## 2023-11-28 RX ORDER — CEFAZOLIN SODIUM 1 G
2000 VIAL (EA) INJECTION ONCE
Refills: 0 | Status: COMPLETED | OUTPATIENT
Start: 2023-11-28 | End: 2023-11-28

## 2023-11-28 RX ORDER — SODIUM CHLORIDE 9 MG/ML
1000 INJECTION, SOLUTION INTRAVENOUS
Refills: 0 | Status: DISCONTINUED | OUTPATIENT
Start: 2023-11-28 | End: 2023-12-12

## 2023-11-28 RX ORDER — TAMSULOSIN HYDROCHLORIDE 0.4 MG/1
1 CAPSULE ORAL
Refills: 0 | DISCHARGE

## 2023-11-28 RX ADMIN — SODIUM CHLORIDE 3 MILLILITER(S): 9 INJECTION INTRAMUSCULAR; INTRAVENOUS; SUBCUTANEOUS at 10:46

## 2023-11-28 RX ADMIN — SODIUM CHLORIDE 100 MILLILITER(S): 9 INJECTION, SOLUTION INTRAVENOUS at 10:43

## 2023-11-28 NOTE — ASU DISCHARGE PLAN (ADULT/PEDIATRIC) - ASU DC SPECIAL INSTRUCTIONSFT
Discharge Instructions: Ureteroscopy    · Stent: You have an internal stent (a hollow tube that runs from the kidney to your bladder) after your procedure, which helps urine drain from the kidney to your bladder. Some patients experience urinary frequency, burning, or even back pain (especially with urination). These sensations will gradually get better. Increasing your fluid intake can also improve these symptoms. While the stent is in place, your urine may continue to be bloody. This stent is temporary and must be removed or exchanged by your urologist as an outpatient within 3 months unless otherwise specified.    o If your stent is on a string, it is secured to your leg or genitalia with an adhesive bandage. Do not pull on the string, do not remove the bandage, do not insert anything intravaginally (such as a tampon), and do not engage in sexual intercourse until after the stent is removed at your post-operative appointment.    · General: It is common to have blood in the urine after your procedure. It may be pink or even red; inform your doctor if you have a significant amount of clot in the urine or if you are unable to void at all. The urine may clear and then become bloody again especially as you are more physically active.    · Bathing: You may shower or bathe.    · Diet: You may resume your regular diet and regular medication regimen.    · Pain: You may take Tylenol (acetaminophen) 650-975mg and/or Motrin (ibuprofen) 400-600mg, available over the counter, for pain every 6 hours as needed. Do not exceed 4000 milligrams of Tylenol (acetaminophen) daily. You may alternate these medications such that you take either one every 3 hours.    · Antibiotics: You have been given a prescription for an antibiotic, please take this medication as instructed and be sure to complete entire course.    · Stool softeners: Do not allow yourself to become constipated as this may increase your bother from the stent and/or straining may cause bleeding. Take stool softeners (ex. Colace) or a laxative (ex. Senekot, ExLax), available over the counter, if needed.    · Activity: No heavy lifting or strenuous exercise until you are evaluated at your post-operative appointment. Otherwise, you may return to your usual level of activity.    · Anticoagulation: If you are taking any blood thinning medications, please discuss with your urologist prior to restarting these medications unless otherwise specified.    · Follow-up: If you did not already schedule your post-operative appointment, please call your urologist to schedule a follow-up appointment.    · Call your urologist if: You have any bleeding that does not stop, inability to void >8 hours, fever over 100.4 F, chills, persistent nausea/vomiting, or if your pain is not controlled on your discharge pain medications. Discharge Instructions: Ureteroscopy  ******************************************************************************************   Stent: You have an internal stent (a hollow tube that runs from the kidney to your bladder) after your procedure, which helps urine drain from the kidney to your bladder. Some patients experience urinary frequency, burning, or even back pain (especially with urination). These sensations will gradually get better. Increasing your fluid intake can also improve these symptoms. While the stent is in place, your urine may continue to be bloody. This stent is temporary and must be removed or exchanged by your urologist as an outpatient within 3 months unless otherwise specified.  ******************************************************************************************   If your stent is on a string, it is secured to your leg or genitalia with an adhesive bandage. Do not pull on the string, do not remove the bandage, do not insert anything intravaginally (such as a tampon), and do not engage in sexual intercourse until after the stent is removed at your post-operative appointment.  ******************************************************************************************   General: It is common to have blood in the urine after your procedure. It may be pink or even red; inform your doctor if you have a significant amount of clot in the urine or if you are unable to void at all. The urine may clear and then become bloody again especially as you are more physically active.  ******************************************************************************************   Bathing: You may shower or bathe.  ******************************************************************************************   Diet: You may resume your regular diet and regular medication regimen.  ******************************************************************************************   Pain: You may take Tylenol (acetaminophen) 650-975mg and/or Motrin (ibuprofen) 400-600mg, available over the counter, for pain every 6 hours as needed. Do not exceed 4000 milligrams of Tylenol (acetaminophen) daily. You may alternate these medications such that you take either one every 3 hours.  ******************************************************************************************   Antibiotics: You have been given a prescription for an antibiotic, please take this medication as instructed and be sure to complete entire course.  ******************************************************************************************   Stool softeners: Do not allow yourself to become constipated as this may increase your bother from the stent and/or straining may cause bleeding. Take stool softeners (ex. Colace) or a laxative (ex. Senekot, ExLax), available over the counter, if needed.  ******************************************************************************************   Activity: No heavy lifting or strenuous exercise until you are evaluated at your post-operative appointment. Otherwise, you may return to your usual level of activity.  ******************************************************************************************   Anticoagulation: If you are taking any blood thinning medications, please discuss with your urologist prior to restarting these medications unless otherwise specified.  ******************************************************************************************   Follow-up: If you did not already schedule your post-operative appointment, please call your urologist to schedule a follow-up appointment.  ******************************************************************************************   Call your urologist if: You have any bleeding that does not stop, inability to void >8 hours, fever over 100.4 F, chills, persistent nausea/vomiting, or if your pain is not controlled on your discharge pain medications.

## 2023-11-28 NOTE — ASU DISCHARGE PLAN (ADULT/PEDIATRIC) - NS MD DC FALL RISK RISK
For information on Fall & Injury Prevention, visit: [email or web address] OR  [email or web address] OR  https://www.cdc.gov/steadi/patient.html

## 2023-11-28 NOTE — ASU DISCHARGE PLAN (ADULT/PEDIATRIC) - CARE PROVIDER_API CALL
Shankar Freed  Urology  [address] [place] [address], [address] [address]  Phone: [phone]  Fax: [phone]  Follow Up Time: 1 week

## 2023-11-28 NOTE — ASU DISCHARGE PLAN (ADULT/PEDIATRIC) - NURSING INSTRUCTIONS
OK to take Tylenol/Acetaminophen at 6PM TONIGHT 14/28 (last dose @   12PM  in operating room)  for pain and every 6 hours after as needed. OK to take Motrin/Ibuprofen at ANY TIME TODAY 11/28 for pain and every 6 hours after as needed.

## 2023-11-29 PROBLEM — N20.0 CALCULUS OF KIDNEY: Chronic | Status: ACTIVE | Noted: 2023-11-16

## 2023-11-30 ENCOUNTER — APPOINTMENT (OUTPATIENT)
Dept: UROLOGY | Facility: CLINIC | Age: 28
End: 2023-11-30
Payer: COMMERCIAL

## 2023-11-30 DIAGNOSIS — N20.0 CALCULUS OF KIDNEY: ICD-10-CM

## 2023-11-30 PROCEDURE — 99213 OFFICE O/P EST LOW 20 MIN: CPT

## 2025-09-07 ENCOUNTER — NON-APPOINTMENT (OUTPATIENT)
Age: 30
End: 2025-09-07

## (undated) DEVICE — GLV 6.5 PROTEXIS (WHITE)

## (undated) DEVICE — SOL IRR BAG NS 0.9% 3000ML

## (undated) DEVICE — SOL IRR POUR NS 0.9% 500ML

## (undated) DEVICE — VENODYNE/SCD SLEEVE CALF LARGE

## (undated) DEVICE — GLV 7 PROTEXIS (WHITE)

## (undated) DEVICE — GLV 7.5 PROTEXIS (WHITE)

## (undated) DEVICE — SYR ASEPTO

## (undated) DEVICE — GOWN TRIMAX LG

## (undated) DEVICE — ACMI SELF-SEALING SEAL UP TO 7FR

## (undated) DEVICE — POSITIONER FOAM HEADREST (PINK)

## (undated) DEVICE — GLV 8 PROTEXIS (WHITE)

## (undated) DEVICE — SOL IRR BAG H2O 3000ML

## (undated) DEVICE — TUBING RANGER FLUID IRRIGATION SET DISP

## (undated) DEVICE — IRR BULB PATHFINDER + 10"

## (undated) DEVICE — POSITIONER FOAM EGG CRATE ULNAR 2PCS (PINK)

## (undated) DEVICE — WARMING BLANKET UPPER ADULT

## (undated) DEVICE — FOLEY HOLDER STATLOCK 2 WAY ADULT

## (undated) DEVICE — FOLEY TRAY 16FR 5CC LTX UMETER CLOSED

## (undated) DEVICE — PACK CYSTO

## (undated) DEVICE — DRAPE EQUIPMENT BANDED BAG 30 X 30" (SHOWER CAP)

## (undated) DEVICE — TUBING SUCTION 20FT

## (undated) DEVICE — SOL IRR POUR H2O 1500ML